# Patient Record
Sex: FEMALE | Race: BLACK OR AFRICAN AMERICAN | HISPANIC OR LATINO | Employment: OTHER | URBAN - METROPOLITAN AREA
[De-identification: names, ages, dates, MRNs, and addresses within clinical notes are randomized per-mention and may not be internally consistent; named-entity substitution may affect disease eponyms.]

---

## 2023-04-04 ENCOUNTER — OFFICE VISIT (OUTPATIENT)
Dept: FAMILY MEDICINE CLINIC | Facility: CLINIC | Age: 68
End: 2023-04-04

## 2023-04-04 VITALS
HEIGHT: 60 IN | RESPIRATION RATE: 18 BRPM | HEART RATE: 69 BPM | WEIGHT: 132.6 LBS | DIASTOLIC BLOOD PRESSURE: 76 MMHG | TEMPERATURE: 97.2 F | OXYGEN SATURATION: 99 % | BODY MASS INDEX: 26.03 KG/M2 | SYSTOLIC BLOOD PRESSURE: 138 MMHG

## 2023-04-04 DIAGNOSIS — R07.9 CHEST PAIN, UNSPECIFIED TYPE: ICD-10-CM

## 2023-04-04 DIAGNOSIS — Z11.4 SCREENING FOR HIV (HUMAN IMMUNODEFICIENCY VIRUS): ICD-10-CM

## 2023-04-04 DIAGNOSIS — Z12.12 SCREENING FOR COLORECTAL CANCER: ICD-10-CM

## 2023-04-04 DIAGNOSIS — E11.49 OTHER DIABETIC NEUROLOGICAL COMPLICATION ASSOCIATED WITH TYPE 2 DIABETES MELLITUS (HCC): ICD-10-CM

## 2023-04-04 DIAGNOSIS — E78.2 MIXED HYPERLIPIDEMIA: ICD-10-CM

## 2023-04-04 DIAGNOSIS — Z11.59 NEED FOR HEPATITIS C SCREENING TEST: ICD-10-CM

## 2023-04-04 DIAGNOSIS — Z76.89 ENCOUNTER TO ESTABLISH CARE: Primary | ICD-10-CM

## 2023-04-04 DIAGNOSIS — E11.9 TYPE 2 DIABETES MELLITUS WITHOUT COMPLICATION, WITHOUT LONG-TERM CURRENT USE OF INSULIN (HCC): ICD-10-CM

## 2023-04-04 DIAGNOSIS — Z12.31 ENCOUNTER FOR SCREENING MAMMOGRAM FOR BREAST CANCER: ICD-10-CM

## 2023-04-04 DIAGNOSIS — I10 PRIMARY HYPERTENSION: ICD-10-CM

## 2023-04-04 DIAGNOSIS — Z12.11 SCREENING FOR COLORECTAL CANCER: ICD-10-CM

## 2023-04-04 DIAGNOSIS — Z00.00 HEALTHCARE MAINTENANCE: ICD-10-CM

## 2023-04-04 PROBLEM — E11.40 DIABETIC NEUROPATHY (HCC): Status: ACTIVE | Noted: 2023-04-04

## 2023-04-04 RX ORDER — LOSARTAN POTASSIUM 25 MG/1
25 TABLET ORAL DAILY
Qty: 30 TABLET | Refills: 2 | Status: SHIPPED | OUTPATIENT
Start: 2023-04-04 | End: 2023-07-03

## 2023-04-04 RX ORDER — LOSARTAN POTASSIUM 25 MG/1
25 TABLET ORAL DAILY
COMMUNITY
End: 2023-04-04 | Stop reason: SDUPTHER

## 2023-04-04 RX ORDER — ASPIRIN 81 MG/1
81 TABLET, CHEWABLE ORAL DAILY
Qty: 30 TABLET | Refills: 2 | Status: SHIPPED | OUTPATIENT
Start: 2023-04-04 | End: 2023-07-03

## 2023-04-04 RX ORDER — BLOOD-GLUCOSE METER
KIT MISCELLANEOUS
Qty: 1 KIT | Refills: 0 | Status: SHIPPED | OUTPATIENT
Start: 2023-04-04

## 2023-04-04 RX ORDER — LANCETS 33 GAUGE
EACH MISCELLANEOUS
Qty: 100 EACH | Refills: 3 | Status: SHIPPED | OUTPATIENT
Start: 2023-04-04

## 2023-04-04 RX ORDER — BLOOD SUGAR DIAGNOSTIC
STRIP MISCELLANEOUS
Qty: 100 EACH | Refills: 3 | Status: SHIPPED | OUTPATIENT
Start: 2023-04-04

## 2023-04-04 RX ORDER — GABAPENTIN 100 MG/1
100 CAPSULE ORAL DAILY
Qty: 30 CAPSULE | Refills: 0 | Status: SHIPPED | OUTPATIENT
Start: 2023-04-04 | End: 2023-05-04

## 2023-04-04 RX ORDER — ASPIRIN 81 MG/1
81 TABLET, CHEWABLE ORAL DAILY
COMMUNITY
End: 2023-04-04 | Stop reason: SDUPTHER

## 2023-04-04 RX ORDER — ATORVASTATIN CALCIUM 20 MG/1
20 TABLET, FILM COATED ORAL DAILY
COMMUNITY

## 2023-04-04 RX ORDER — MEDICAL SUPPLY, MISCELLANEOUS
EACH MISCELLANEOUS ONCE
Qty: 1 EACH | Refills: 0 | Status: SHIPPED | OUTPATIENT
Start: 2023-04-04 | End: 2023-04-04

## 2023-04-04 NOTE — ASSESSMENT & PLAN NOTE
"Report numbness and tingling of bilateral feet   Start gabapentin 100 mg daily - will start on low dose once daily due to patient concern for side effects     Patient states \"i'm only going to take it for one month\"   "

## 2023-04-04 NOTE — PATIENT INSTRUCTIONS
Control de la diabetes tipo 2 en los adultos   CUIDADO AMBULATORIO:   La diabetes tipo 2 es pérez enfermedad que afecta la forma en que el cuerpo utiliza la glucosa (azúcar)  El cuerpo no puede producir suficiente insulina o es incapaz de usarla adecuadamente  Es importante controlar la diabetes para evitar el daño al corazón, los vasos sanguíneos y otros órganos  El control lo ayudará a sentirse cori y a disfrutar de delmy actividades diarias  Delmy médicos del equipo de cuidado de la diabetes pueden ayudarlo a hacer un plan para que el cuidado de la diabetes encaje en belle horario  Belle plan puede cambiar con el tiempo para adaptarse a delmy necesidades y a las de [address]  [place] a alguien que llame al [employer] de emergencias local (911 en los [place]) si:  • No es posible despertarlo  • Tiene signos de cetoacidosis diabética:     ? confusión, fatiga    ? vómitos    ? latidos cardíacos rápidos    ? aliento con [employer] a frutas    ? sed extrema    ? sequedad en la boca y la piel    • Tiene alguno de los siguientes signos de un ataque cardíaco:      ? Estrujamiento, presión o tensión en belle pecho    ? Usted también podría presentar alguno de los siguientes:     - Malestar o dolor en belle espalda, freddy, mandíbula, abdomen, o brazo    - Falta de aliento    - [employer] o vómitos    - Desvanecimiento o sudor frío repentino    • Usted tiene alguno de los siguientes signos de derrame cerebral:      ? Adormecimiento o caída de un lado de belle traci    ? Debilidad en un Chiqui Bloodgood o pérez pierna    ? Confusión o debilidad para hablar    ? Mareos o dolor de juanis intenso, o pérdida de la visión  Llame a belle médico o al equipo de cuidado de la diabetes si:  • Tiene pérez llaga o pérez herida que no cicatrizan  • Tiene un cambio en la cantidad de orina  • Delmy niveles de azúcar en la koffi son superiores a las metas fijadas  • Usted a menudo tiene niveles de azúcar en la koffi más bajos que delmy metas fijadas      • Belle piel está enrojecida, seca, caliente al tacto o inflamada  • Usted tiene problemas para sobrellevar belle diabetes o se siente ansioso o deprimido  • Usted tiene preguntas o inquietudes acerca de belle condición o cuidado  Lo que usted necesita saber sobre los niveles altos de azúcar en la koffi: El azúcar alto en la koffi puede no causar ningún síntoma  Puede sentir más sed u orinar con más frecuencia de lo habitual  Con el tiempo, los niveles altos de azúcar en la koffi pueden dañar ery nervios, vasos sanguíneos, tejidos y órganos  Lo siguiente aumenta los niveles de azúcar en la koffi:  • Comidas copiosas o grandes cantidades de carbohidratos de pérez jay vez    • Menos actividad física    • Estrés    • Enfermedad    • Pérez dosis elder de medicamento para la diabetes o insulina, o pérez dosis tardía    Lo que usted necesita saber sobre los niveles bajos de azúcar en la kfofi: Los síntomas incluyen sensación de temblores, [place], irritabilidad o confusión  Puedes prevenir los síntomas evitando que los niveles de azúcar en koffi [place]  • Trate los niveles bajos de azúcar en la koffi inmediatamente:     ? Shaila 4 onzas de jugo o 1 tubo de gel de glucosa  ? Revise nuevamente belle nivel de azúcar en la koffi en 10 a 15 minutos  ? Cuando el nivel regrese a la normalidad, coma un alimento o refrigerio para prevenir otro bajón  • Lleve siempre consigo gel de glucosa, uvas pasas o caramelos duros para tratar los niveles bajos de azúcar en la koffi  • Belle azúcar en la koffi puede bajar demasiado si ora un medicamento para la diabetes o la insulina y no consume suficientes alimentos  • Si [place] insulina, verifique belle nivel de azúcar en la koffi antes de hacer ejercicio  ? Si belle nivel de azúcar en la koffi es inferior a 100 mg/dL, coma 4 galletas, 2 onzas de uvas pasas o shaila 4 onzas de jugo  ? Compruebe belle nivel cada 30 minutos si hace ejercicio reyna más de 1 hora      ? Puede que necesite pérez merienda reyna o después de hacer ejercicio  Qué puede hacer para manejar rey niveles de azúcar en la koffi:  • Revise rey niveles de azúcar en la koffi según las indicaciones y según sea necesario  Hay varios elementos disponibles que puede utilizar para comprobar rey [place]  Puede que tenga que comprobarlo probando pérez gota de Talha Lin en un medidor de glucosa  En belle lugar, es posible que le den un monitor continuo de glucosa (MCG)  El dispositivo se lleva puesto en todo momento  El MCG revisa belle nivel de azúcar en la koffi cada 5 minutos  [employer] a un dispositivo electrónico, genoveva un teléfono inteligente  Un MCG puede utilizarse con o sin pérez bomba de insulina  Usted y los médicos de belle equipo de atención de la diabetes decidirán cuál es el mejor método para usted  El objetivo de los niveles de azúcar en la koffi antes de las comidas es entre 80 y 130 mg/dL y 2 horas después de comer  es inferior a 180 mg/dL  • Elija opciones de alimentos saludables  Consulte con belle dietista para crear un plan de comidas que funcione para usted y rey horarios  Un dietista puede ayudarlo para que aprenda cómo alimentarse cori con la cantidad [place] de carbohidratos reyna las comidas y [place]  Los carbohidratos pueden subir belle azúcar en la koffi si usted come demasiado a la vez  Algunos ejemplos de alimentos que contienen carbohidratos son panes, cereales, arroz, pasta y dulces  • Coma alimentos altos en fibras, según las indicaciones  La fibra ayuda a mejorar los niveles de azúcar en la koffi  La fibra también reduce el riesgo de padecer enfermedades cardíacas y [de-identified] problemas que puede causar la diabetes  Por ejemplo, los alimentos ricos en fibra verduras, pan integral y frijoles, genoveva los frijoles pintos  Belle dietista puede indicarle cuánta fibra debe consumir cada día  • Realice actividad física regularmente   La actividad física puede ayudarlo a alcanzar belle objetivo de nivel de azúcar en koffi y a controlar belle peso  Ar al menos 150 minutos de [place] física [place] de moderada a vigorosa cada semana  No deje de realizarla reyna más de 2 días seguidos  No permanezca sentada por más de 30 minutos cada vez  Belle médico puede ayudarle a crear un plan de actividades  El plan puede incluir las mejores actividades para usted y puede ayudarlo a desarrollar belle fuerza y Cosimo Keens  • Mantenga un peso saludable  Pregúntele a belle equipo cuál es el peso ideal para usted  El peso saludable puede ayudarle a controlar belle diabetes y a evitar pérez enfermedad cardíaca  Pregúntele a belle equipo que lo ayude a elaborar un plan para perder peso de manera si lo necesita  La pérdida de peso puede ayudar a hacer pérez diferencia en el control de belle diabetes  Belle equipo establecerá pérez meta de pérdida de peso, genoveva 10 a 15 libras o 5% de belle sobrepeso  Juntos, usted y belle equipo, podrán fijar metas de pérdida de peso alcanzables  • [place] belle medicamento para la diabetes o la insulina según las indicaciones  Es posible que necesite medicamento para la diabetes, insulina o ambos para controlar rey niveles de glucosa en koffi  Belle médico le indicará cómo y cuándo se debe lissa belle medicamento de diabetes o la insulina  También se le enseñarán los efectos secundarios que pueden causar los medicamentos orales para la diabetes  La insulina puede administrarse mediante inyección o pérez bomba o pérez pluma  Usted y los médicos decidirán cuál es el mejor método para usted:    ? La bomba de insulina es un dispositivo implantado que le da insulina las 24 horas del día  Pérez bomba de insulina previene la necesidad de inyecciones múltiples de insulina en un día  ? La pluma para insulina es un dispositivo precargado con la cantidad [place] de insulina  ? A usted y belle jeanne les enseñarán cómo preparar y administrar la insulina si ciarra es el mejor método para usted   Liz Paez también le enseñarán cómo desechar las agujas y [place]  ? Aprenderá la cantidad de insulina que necesita y cuándo administrarla  Se le enseñará cuándo no administrar la insulina  También se le enseñará lo que debe hacer si belle nivel de azúcar en la koffi baja demasiado  [place] puede suceder si usted ora insulina y no come la cantidad [place] de carbohidratos  Más maneras para controlar la diabetes tipo 2:  • Use identificación de alerta médica  Use un brazalete o collar de alerta médica o lleve consigo pérez tarjeta que indique que tiene diabetes  Pregunte a belle médico dónde puede conseguir esos artículos  • No fume  La nicotina y otras sustancias químicas de los cigarrillos y los cigarros pueden dañar el pulmón y los vasos sanguíneos  También dificulta el control de la diabetes  Pida información a belle médico si usted actualmente fuma y necesita ayuda para dejar de fumar  No use cigarrillos electrónicos o tabaco sin humo en vez de cigarrillos o para tratar de dejar de fumar  Todos estos aún contienen nicotina  • Revise rey pies todos los días por cortadas, raspones, callos u otras heridas  Aminata pendiente de enrojecimiento e inflamación y de calor al tacto  Use zapatos que le calcen cori  Compruebe que no haya piedras u otros objetos dentro de rey zapatos que le podrían [facility]  No camine descalzo ni use zapatos sin calcetines  Use calcetines de algodón para ayudar a [employer]  • Pregunte sobre las vacunas que pudiera necesitar  Usted corre un mayor riesgo de presentar enfermedades graves si se contagia gripe, neumonía, COVID-19 o hepatitis  Pregunte a belle médico si debe vacunarse para prevenir estas u otras enfermedades, y cuándo debe hacerlo  • Hable con belle médico si se siente estresado por el cuidado de la diabetes  A veces, encajar el cuidado de la diabetes en belle vikram puede provocar un aumento del estrés  El estrés puede hacer que no se cuide [place]   Los médicos de belle equipo de atención pueden ayudarlo con consejos sobre el autocuidado  Rey médicos pueden sugerirle que hable con un profesional de la madeleine mental que pueda escuchar y ofrecerle ayuda en cuestiones de autocuidado  • Hágase un control de la A1c según las indicaciones  Belle médico puede comprobar belle A1c cada 3 meses, o 2 veces al año si belle diabetes está controlada  El examen de A1c muestra la cantidad promedio de azúcar en belle koffi reyna los últimos 2 a 3 meses  Belle médico le dirá cuál debe ser belle nivel de A1c     • Hágase las pruebas de detección genoveva se le indique  Belle médico podría recomendarle que se ar pruebas para detectar complicaciones de la diabetes y otras condiciones que puedan desarrollarse  Algunas pruebas de detección pueden comenzar inmediatamente y otros pueden ocurrir dentro de los primeros 5 años del diagnóstico:    ? Los ejemplos de complicaciones de la diabetes incluyen problemas renales, colesterol alto, presión arterial chong, problemas vasculares, problemas oculares y apnea del sueño  ? Se le puede hacer pérez prueba para detectar niveles bajos de vitamina B si ora medicamentos orales para la diabetes reyna mucho tiempo  ? [employer] en edad fértil pueden hacerse pruebas para detectar síndrome del ovario poliquístico (SOPQ)  Ar un seguimiento con belle médico o con los proveedores del equipo de cuidado de la diabetes según las instrucciones: Es posible que a usted le carolin análisis de koffi antes de la kamron de control  Los [employer] de las pruebas mostrarán si es necesario hacer cambios en el tratamiento o en los cuidados personales  Hable con belle médico si no puede pagar rey medicamentos  Anote rey preguntas para que se acuerde de hacerlas reyna rey visitas  © Copyright [place] [employer] 2022 Information is for End User's use only and may not be sold, redistributed or otherwise used for commercial purposes  Esta información es sólo para uso en educación   Belle intención no es darle un consejo médico sobre enfermedades o tratamientos  Colsulte con belle Kathye Garb farmacéutico antes de seguir cualquier régimen médico para saber si es seguro y efectivo para usted

## 2023-04-04 NOTE — ASSESSMENT & PLAN NOTE
"Patient reports weekly episodes of chest pain with palpitations for over a year  Was evaluated in [place] per patient with many tests and \"everything was fine\"   Patient to sign MRO to obtain records  Patient denies chest pain/palpiations during visit   ED precautions discussed      ECG -pending  "

## 2023-04-04 NOTE — PROGRESS NOTES
Name: Alberto Saavedra      : 1955      MRN: [id number]  Encounter Provider: ROSLYN Ferguson  Encounter Date: 2023   Encounter department: [address] [place] [address]     1  Encounter to establish care    2  Type 2 diabetes mellitus without complication, without long-term current use of insulin ([facility] )  Assessment & Plan:  Patient refused to have A1C checked in office states she would like to do it in the lab while fasting    -blood sugars at home ranging from 160-190; however patient states unsure if monitor has been working properly    -currently on metformin 500 mg daily - continue; may need to increase dependent on A1C results  Patient requesting referral to endocrinology    Basic diet recommendation     - Drinks  o Okay to drink - Water, flavored seltzer water, beverages like coffee, tea, green tea, herbal tea without any sugar  o Cut down on - Regular soda, diet soda, diet drinks and 100% fruit juice    - Sweetener  o Can use stevia, truvia, monk fruit  o Avoid sugar, other artificial sweeteners, honey or agave     - Fruits  o Apples, pears, berries - consume in moderation  Avoid juices, instead consume whole fruit    - Eat 3 balanced meals   Limit snacks to 1-2 times daily     - Foods to cut down on  o Bread, pasta, corn, cereal  o Starchy vegetables like potatoes, yams, taro  o Peas, lentils, beans ( except green beans and snow peas)    - Safe snacks  o 12 unsalted nuts and 6 rice crackers OR  o 1 apple with 1-2 spoons of peanut butter OR  o 100 calorie nuts and 1 small box of raisins ( kid’s size) OR  o 6oz plain or vanilla [place] yogurt and ½ cup berries OR  o ½ tuna sandwich OR  o 12 edamame pods OR  o 1 pear and 1-2 slices low fat cheese  o Raw vegetable sticks ( 2 carrot sticks, 2 celery sticks, raw broccoli, pepper, cucumber, or tomato)    - Exercise     CURRENT [employer] TIPS ON EXERCISE:  IF YOU HAVE CONDITIONS THAT "PREVENT AEROBIC EXERCISE:  WALK 30 MINUTES AT LEAST 5 DAYS PER WEEK; MAY BREAK IT UP INTO 10-  15 MINUTE SESSIONS  GET SOME RESISTANCE EXERCISES USING THE MAJOR MUSCLE GROUPS 2-3 TIMES PER WEEK   IF YOU ARE PHYSICALLY ABLE:  TRY TO GET 10,000 STEPS 3 TIMES PER WEEK  PLUS  GO \"ONLINE\" TO CHECK TARGET HEART RATE FOR YOUR AGE AND DO AEROBIC EXERCISES (JOG/STATIONARY BIKE/ELLIPTICAL) FOR 20-30 MINUTES 2-3 TIMES PER WEEK  Orders:  -     Hemoglobin A1C; Future  -     Microalbumin / creatinine urine ratio  -     Ambulatory Referral to Endocrinology; Future  -     Blood Glucose Monitoring Suppl (OneTouch Verio Reflect) w/Device KIT; Check blood sugars once daily  Please substitute with appropriate alternative as covered by patient's insurance  Dx: E11 65  -     glucose blood (OneTouch Verio) test strip; Check blood sugars once daily  Please substitute with appropriate alternative as covered by patient's insurance  Dx: E11 65  -     OneTouch Delica Lancets 73D MISC; Check blood sugars once daily  Please substitute with appropriate alternative as covered by patient's insurance  Dx: E11 65  -     metFORMIN (GLUCOPHAGE) 500 mg tablet; Take 1 tablet (500 mg total) by mouth in the morning    3  Primary hypertension  Assessment & Plan:  - Blood pressure currently well controlled with current antihypertensive therapy Losartan 25 mg daily  - No complaints of adverse effects, including visual changes, dizziness, headaches or syncope  - Will continue current therapy  - Encouraged continued use of home blood pressure logs  - Encouraged diet and exercise regimen as tolerated  Orders:  -     aspirin 81 mg chewable tablet; Chew 1 tablet (81 mg total) daily  -     losartan (COZAAR) 25 mg tablet; Take 1 tablet (25 mg total) by mouth daily  -     Blood Pressure Monitoring (B-D ASSURE BPM/AUTO ARM CUFF) MISC; Use 1 (one) time for 1 dose    4   Chest pain, unspecified type  Assessment & Plan:  Patient reports weekly episodes of " "chest pain with palpitations for over a year  Was evaluated in [place] per patient with many tests and \"everything was fine\"   Patient to sign MRO to obtain records  Patient denies chest pain/palpiations during visit   ED precautions discussed  ECG -pending    Orders:  -     ECG 12 lead; Future  -     Ambulatory Referral to Cardiology; Future  -     aspirin 81 mg chewable tablet; Chew 1 tablet (81 mg total) daily    5  Other diabetic neurological complication associated with type 2 diabetes mellitus ([facility] )  Assessment & Plan:  Report numbness and tingling of bilateral feet   Start gabapentin 100 mg daily - will start on low dose once daily due to patient concern for side effects  Patient states \"i'm only going to take it for one month\"     Orders:  -     gabapentin (Neurontin) 100 mg capsule; Take 1 capsule (100 mg total) by mouth daily    6  Mixed hyperlipidemia  Assessment & Plan:  Self reported history of hyperlipidemia - patient self discontinued atorvastatin 20 mg, reports she has changed her diet and does not think she needs this medication   Will check lipid panel      7  Healthcare maintenance  -     CBC and differential; Future  -     Comprehensive metabolic panel; Future  -     Hemoglobin A1C; Future  -     Lipid panel; Future  -     TSH, 3rd generation with Free T4 reflex; Future  -     Microalbumin / creatinine urine ratio    8  Need for hepatitis C screening test  -     Hepatitis C Antibody ([facility]); Future    9  Screening for HIV (human immunodeficiency virus)  -     HIV 1/2 AG/AB w Reflex [facility] for 2 yr old and above; Future    10  Encounter for screening mammogram for breast cancer  -     Mammo screening bilateral w 3d & cad; Future; Expected date: 04/04/2023    11  Screening for colorectal cancer  -     Occult Blood, Fecal Immunochemical (FIT); Future    BMI Counseling: Body mass index is 25 9 kg/m²   The BMI is above normal  Nutrition recommendations include decreasing portion sizes, " encouraging healthy choices of fruits and vegetables, decreasing fast food intake, consuming healthier snacks, limiting drinks that contain sugar, moderation in carbohydrate intake, increasing intake of lean protein, reducing intake of saturated and trans fat and reducing intake of cholesterol  Exercise recommendations include exercising 3-5 times per week  No pharmacotherapy was ordered  Rationale for BMI follow-up plan is due to patient being overweight or obese  Depression Screening and Follow-up Plan: Patient was screened for depression during today's encounter  They screened negative with a PHQ-2 score of 0  Leandro      Cody Enter FelizDeBreyette 74 y o  female  has a past medical history of Diabetes mellitus ([facility] ) and Hypertension  [facility] to [facility]  Patient moved recently to the area from [facility] and wants follow up for chronic conditions  At the time of the visit patient is well appearing, denies chest pain, SOB, dyspnea, dizziness  Diabetes  She presents for her follow-up diabetic visit  She has type 2 diabetes mellitus  No MedicAlert identification noted  Her disease course has been fluctuating  There are no hypoglycemic associated symptoms  Pertinent negatives for hypoglycemia include no confusion, dizziness, headaches, nervousness/anxiousness, seizures, sleepiness, speech difficulty or tremors  Associated symptoms include chest pain and foot paresthesias  Pertinent negatives for diabetes include no blurred vision, no fatigue, no polydipsia, no polyphagia, no polyuria and no visual change  There are no hypoglycemic complications  Pertinent negatives for hypoglycemia complications include no blackouts  Diabetic complications include nephropathy  Risk factors for coronary artery disease include hypertension  Current diabetic treatment includes diet and oral agent (monotherapy)  She is compliant with treatment all of the time  Hypertension  This is a chronic problem   The current episode started more than 1 year ago  The problem has been waxing and waning since onset  The problem is controlled  Associated symptoms include chest pain and palpitations  Pertinent negatives include no blurred vision, headaches, peripheral edema or shortness of breath  There are no associated agents to hypertension  Risk factors for coronary artery disease include diabetes mellitus  Past treatments include alpha 1 blockers  The current treatment provides moderate improvement  There are no compliance problems  Chest Pain   This is a recurrent problem  The current episode started more than 1 month ago  The onset quality is sudden  The problem occurs intermittently (once weekly)  The problem has been waxing and waning  The pain is present in the lateral region  The pain is at a severity of 0/10  The patient is experiencing no pain  Associated symptoms include palpitations  Pertinent negatives include no abdominal pain, back pain, cough, dizziness, fever, headaches, shortness of breath or vomiting  Her past medical history is significant for hyperlipidemia  Pertinent negatives for past medical history include no seizures  Review of Systems   Constitutional: Negative for chills, fatigue and fever  HENT: Negative for ear pain and sore throat  Eyes: Negative for blurred vision, pain and visual disturbance  Respiratory: Negative for cough and shortness of breath  Cardiovascular: Positive for chest pain and palpitations  Gastrointestinal: Negative for abdominal pain and vomiting  Endocrine: Negative for polydipsia, polyphagia and polyuria  Genitourinary: Negative for dysuria and hematuria  Musculoskeletal: Negative for arthralgias and back pain  Skin: Negative for color change and rash  Neurological: Negative for dizziness, tremors, seizures, syncope, speech difficulty and headaches  Psychiatric/Behavioral: Negative for confusion  The patient is not nervous/anxious      All other systems reviewed and are negative  Current Outpatient Medications on File Prior to Visit   Medication Sig   • atorvastatin (LIPITOR) 20 mg tablet Take 20 mg by mouth daily   • [DISCONTINUED] aspirin 81 mg chewable tablet Chew 81 mg daily   • [DISCONTINUED] losartan (COZAAR) 25 mg tablet Take 25 mg by mouth daily   • [DISCONTINUED] metFORMIN (GLUCOPHAGE) 500 mg tablet Take 500 mg by mouth in the morning       Objective     /76 (BP Location: Left arm, Patient Position: Sitting, Cuff Size: Adult)   Pulse 69   Temp (!) 97 2 °F (36 2 °C) (Temporal)   Resp 18   Ht 5' (1 524 m)   Wt 60 1 kg (132 lb 9 6 oz)   SpO2 99%   BMI 25 90 kg/m²     Physical Exam  Vitals and nursing note reviewed  Constitutional:       General: She is not in acute distress  Appearance: Normal appearance  She is not ill-appearing  HENT:      Head: Normocephalic and atraumatic  Right Ear: Tympanic membrane, ear canal and external ear normal       Left Ear: Tympanic membrane, ear canal and external ear normal       Nose: Nose normal       Mouth/Throat:      Mouth: Mucous membranes are moist    Eyes:      General:         Right eye: No discharge  Left eye: No discharge  Pupils: Pupils are equal, round, and reactive to light  Cardiovascular:      Rate and Rhythm: Normal rate and regular rhythm  Pulses: Normal pulses  no weak pulses     Heart sounds: Normal heart sounds  Pulmonary:      Effort: Pulmonary effort is normal  No respiratory distress  Breath sounds: Normal breath sounds  No wheezing  Abdominal:      General: Bowel sounds are normal       Palpations: Abdomen is soft  Tenderness: There is no abdominal tenderness  There is no right CVA tenderness or left CVA tenderness  Musculoskeletal:         General: Normal range of motion  Cervical back: Normal range of motion  Feet:      Right foot:      Skin integrity: No ulcer, skin breakdown, erythema, warmth, callus or dry skin  Left foot:      Skin integrity: No ulcer, skin breakdown, erythema, warmth, callus or dry skin  Skin:     General: Skin is warm and dry  Neurological:      General: No focal deficit present  Mental Status: She is alert and oriented to person, place, and time  Patient's shoes and socks removed  Right Foot/Ankle   Right Foot Inspection  Skin Exam: skin normal and skin intact  No dry skin, no warmth, no callus, no erythema, no maceration, no abnormal color, no pre-ulcer, no ulcer and no callus  Toe Exam: ROM and strength within normal limits  Sensory   Proprioception: intact  Monofilament testing: intact    Left Foot/Ankle  Left Foot Inspection  Skin Exam: skin normal and skin intact  No dry skin, no warmth, no erythema, no maceration, normal color, no pre-ulcer, no ulcer and no callus  Toe Exam: ROM and strength within normal limits       Sensory   Proprioception: intact  Monofilament testing: intact    Assign Risk Category  No deformity present  No loss of protective sensation  No weak pulses  Risk: [id number] [facility] ROSLYN Pierce

## 2023-04-04 NOTE — ASSESSMENT & PLAN NOTE
"Patient refused to have A1C checked in office states she would like to do it in the lab while fasting    -blood sugars at home ranging from 160-190; however patient states unsure if monitor has been working properly    -currently on metformin 500 mg daily - continue; may need to increase dependent on A1C results  Patient requesting referral to endocrinology    Basic diet recommendation     - Drinks  o Okay to drink - Water, flavored seltzer water, beverages like coffee, tea, green tea, herbal tea without any sugar  o Cut down on - Regular soda, diet soda, diet drinks and 100% fruit juice    - Sweetener  o Can use stevia, truvia, monk fruit  o Avoid sugar, other artificial sweeteners, honey or agave     - Fruits  o Apples, pears, berries - consume in moderation  Avoid juices, instead consume whole fruit    - Eat 3 balanced meals   Limit snacks to 1-2 times daily     - Foods to cut down on  o Bread, pasta, corn, cereal  o Starchy vegetables like potatoes, yams, taro  o Peas, lentils, beans ( except green beans and snow peas)    - Safe snacks  o 12 unsalted nuts and 6 rice crackers OR  o 1 apple with 1-2 spoons of peanut butter OR  o 100 calorie nuts and 1 small box of raisins ( kid’s size) OR  o 6oz plain or vanilla [place] yogurt and ½ cup berries OR  o ½ tuna sandwich OR  o 12 edamame pods OR  o 1 pear and 1-2 slices low fat cheese  o Raw vegetable sticks ( 2 carrot sticks, 2 celery sticks, raw broccoli, pepper, cucumber, or tomato)    - Exercise     CURRENT [employer] TIPS ON EXERCISE:  IF YOU HAVE CONDITIONS THAT PREVENT AEROBIC EXERCISE:  WALK 30 MINUTES AT LEAST 5 DAYS PER WEEK; MAY BREAK IT UP INTO 10-  15 MINUTE SESSIONS  GET SOME RESISTANCE EXERCISES USING THE MAJOR MUSCLE GROUPS 2-3 TIMES PER WEEK   IF YOU ARE PHYSICALLY ABLE:  TRY TO GET 10,000 STEPS 3 TIMES PER WEEK  PLUS  GO \"ONLINE\" TO CHECK TARGET HEART RATE FOR YOUR AGE AND DO AEROBIC EXERCISES (JOG/STATIONARY BIKE/ELLIPTICAL) FOR 20-30 " MINUTES 2-3 TIMES PER WEEK

## 2023-04-04 NOTE — ASSESSMENT & PLAN NOTE
Self reported history of hyperlipidemia - patient self discontinued atorvastatin 20 mg, reports she has changed her diet and does not think she needs this medication   Will check lipid panel

## 2023-04-04 NOTE — ASSESSMENT & PLAN NOTE
- Blood pressure currently well controlled with current antihypertensive therapy Losartan 25 mg daily  - No complaints of adverse effects, including visual changes, dizziness, headaches or syncope  - Will continue current therapy  - Encouraged continued use of home blood pressure logs  - Encouraged diet and exercise regimen as tolerated

## 2023-04-07 ENCOUNTER — TELEPHONE (OUTPATIENT)
Dept: ENDOCRINOLOGY | Facility: CLINIC | Age: 68
End: 2023-04-07